# Patient Record
Sex: FEMALE | Race: WHITE
[De-identification: names, ages, dates, MRNs, and addresses within clinical notes are randomized per-mention and may not be internally consistent; named-entity substitution may affect disease eponyms.]

---

## 2018-11-06 ENCOUNTER — HOSPITAL ENCOUNTER (OUTPATIENT)
Dept: HOSPITAL 80 - FLD | Age: 23
Setting detail: OBSERVATION
Discharge: HOME | End: 2018-11-06
Attending: OBSTETRICS & GYNECOLOGY | Admitting: OBSTETRICS & GYNECOLOGY
Payer: COMMERCIAL

## 2018-11-06 DIAGNOSIS — O99.333: ICD-10-CM

## 2018-11-06 DIAGNOSIS — Z3A.38: ICD-10-CM

## 2018-11-06 DIAGNOSIS — Z91.040: ICD-10-CM

## 2018-11-06 DIAGNOSIS — O21.2: Primary | ICD-10-CM

## 2018-11-06 DIAGNOSIS — F17.210: ICD-10-CM

## 2018-11-06 PROCEDURE — 59025 FETAL NON-STRESS TEST: CPT

## 2018-11-06 PROCEDURE — G0378 HOSPITAL OBSERVATION PER HR: HCPCS

## 2018-11-06 NOTE — SOAPPROG
SOAP Progress Note


Assessment/Plan: 


Assessment: 23 G1 at 38w2d with GERD and likely viral gastroenteritis.  


Epigastric pain resolved with GI cocktail.  


Pt desires dc home. 








Plan: DC home. 


Pt instructed to hydrate well.  If N/V and now diarrhea continues for more than 

24 hours, needs to come back as may need IV hydration. 


BRAT diet


FU as scheduled in office in 3 days. 





Maria Dolores Benitez MD, FACOG


[facility] [place] [facility]





11/06/18 11:50





Subjective: 





Pt feeling OK.  Epigastric pain resolved with GI cocktail. 


Has now had one episode of diarrhea.  Is able to sip water.  Has had a couple 

bites of toast without vomiting.  Declines IV placement and IV hydration - 

prefers to try to hydrate at home. 


Objective: 





gen - pleasant, resting comfortably in bed. 


abd - gravid, NT





- Time Spent With Patient


Time Spent With Patient: 





Total time today - 30 min face to face








- Pending Discharge


Pending Discharge Within 24 Hours: Yes


Pending Discharge Within 48 Hours: Yes


Pending Discharge Date: 11/07/18


Pending Discharge Time: 11:00





ICD10 Worksheet


Patient Problems: 


 Problems











Problem Status Onset


 


GERD (gastroesophageal reflux disease) Acute  


 


Gastroenteritis Acute  


 


Gastroenteritis and colitis, viral Acute  


 


Gastroenteritis and colitis, viral Acute  














- ICD10 Problem Qualifiers


(1) GERD (gastroesophageal reflux disease)








(2) Gastroenteritis








(3) Gastroenteritis and colitis, viral








(4) Gastroenteritis and colitis, viral

## 2018-11-06 NOTE — PDGENHP
History and Physical





- Chief Complaint


vomiting and upper abdominal pain





- History of Present Illness


23 G1 at 38w2d presents with an episode of vomiting this morning.  Got up, felt 

some upper abdominal pain, took a bath, minimal relief, back to bed, then got 

up suddenly and vomited and collapsed as she did so.  Also vomited again once 

after arrival here.  Currently no appetite but no persistent nausea.  Has a hx 

of GERD, and sometimes takes Tums, but did not try this morning.  Similar 

episode happened 4 days ago, but resolved and did not happen again until this 

morning.  Denied Etoh or drug use to DEVANTE Michele.  


no VB, no LOF. Good FM.  NO HA, no vis changes, no change in edema. No fevers.  

No change in bowel or bladder habits. 


Is not feeling any contractions. 





Prenatal care with [facility].  


Dating by LMP and 8w5d US. 


A pos


Rubella NON immune


GBS POS


Innatal neg, all other prenatal labs wnl. 











History Information





- Allergies/Home Medication List


Allergies/Adverse Reactions: 








latex Allergy (Verified 11/06/18 07:54)


 Swelling/neck,face,throat


avocado Allergy (Uncoded 11/06/18 07:54)


 





Home Medications: 








Albuterol [Proventil Inhaler HFA (*)] 1 - 2 puffs IH PRN 11/06/18 [Last Taken 

Unknown]


Ferrous Sulfate [Slow Fe 140 MG (*)] 140 mg PO DAILY 11/06/18 [Last Taken 1 Day 

Ago ~11/05/18]


Prenatal Vit27&Calcium/Iron/FA [Prenatal] 1 tab PO DAILY 11/06/18 [Last Taken 1 

Day Ago ~11/05/18]





I have personally reviewed and updated: family history, medical history, social 

history, surgical history


Past Medical History: migraines, ADD - no meds in preg, Asthma, hx depression 

and anxiety.  LSIL pap - needs colp pp.  L axillary mass - stable x 5yr





- Past Medical History


asthma





- Surgical History


Reports: no pertinent surgical hx


Additional surgical history: no surgery





- Family History


Positive for: non-pertinent





- Social History


Smoking Status: Current some day smoker


Tobacco Use: Less than 1 pack/day (says down to 1 cig per day)


Drug Use: None





Review of Systems


Review of Systems: 





ROS: 10pt was reviewed & negative except for what was stated in HPI & below





Physical Exam


Physical Exam: 


FHR - 130, reactive, mod jere, + accels, no decels, Cat 1


toco - no contractions





36.8  20  97  106/61


Constitutional: no apparent distress


Eyes: PERRL, EOMI


Ears, Nose, Mouth, Throat: moist mucous membranes, hearing normal, ears appear 

normal


Cardiovascular: regular rate and rhythym, systolic murmur


Respiratory: no respiratory distress, no rales or rhonchi


Gastrointestinal: normoactive bowel sounds (fundus firm and NT, c/w 38wk), soft

, non-tender abdomen (mild epigastric tenderness, no rebound or guarding, )


Skin: warm, normal color, no rashes or abrasions


Musculoskeletal: no muscle tenderness (BLE - 1+ brisk reflexes, 1+ pitting edema

)


Neurologic: AAOx3


Psychiatric: interacting appropriately, not anxious





Lab Data & Imaging Review





Urine dip neg - will send for full UA and culture if indicated. 





Assessment & Plan


Assessment: 


23 G1 with epigastric pain and vomiting this morning, similar to an episode 4 d 

ago - suspect GERD. 


Will try a GI cocktail - if relieves pain, will recommend starting on regular 

Pepcid or Zantac until delivers. 


Will also consider Zofran if vomiting continues.  


Reassuring fetal monitoring.  


Maria Dolores Benitez MD, FACOG


[facility]

## 2018-11-09 ENCOUNTER — HOSPITAL ENCOUNTER (INPATIENT)
Dept: HOSPITAL 80 - FLD | Age: 23
LOS: 4 days | Discharge: HOME | End: 2018-11-13
Attending: OBSTETRICS & GYNECOLOGY | Admitting: OBSTETRICS & GYNECOLOGY
Payer: COMMERCIAL

## 2018-11-09 DIAGNOSIS — Z3A.38: ICD-10-CM

## 2018-11-09 LAB — PLATELET # BLD: 131 10^3/UL (ref 150–400)

## 2018-11-09 PROCEDURE — 3E033VJ INTRODUCTION OF OTHER HORMONE INTO PERIPHERAL VEIN, PERCUTANEOUS APPROACH: ICD-10-PCS | Performed by: OBSTETRICS & GYNECOLOGY

## 2018-11-09 RX ADMIN — AMPICILLIN SODIUM SCH: 1 INJECTION, POWDER, FOR SOLUTION INTRAMUSCULAR; INTRAVENOUS at 23:17

## 2018-11-09 RX ADMIN — MISOPROSTOL SCH: 100 TABLET ORAL at 23:17

## 2018-11-09 NOTE — GHP
DATE OF ADMISSION:  2018



ADMITTING DIAGNOSIS:  Intrauterine pregnancy at 38-5/7 weeks' gestation with gestational hypertension
 for induction of labor.



HISTORY OF PRESENT ILLNESS:  The patient is a 23-year-old,  1, para 0, with a last menstrual p
eriod of 2018, and an EDC of 2018, which was confirmed by an 8-week ultrasound.  She has 
had good prenatal care at [facility] since registration at 8 weeks' gestation.  Her signifi
cant prenatal risk factor is she has a history of asthma.  She has albuterol that she uses as needed 
and she does a steroid inhaler daily.  She has a history of depression and anxiety.  No current medic
ation.  She will be monitored closely.  She has a left axillary mass that has been stable.  Question 
lipoma versus accessory breast tissue.  It has not changed in pregnancy.  We will observe.  She had a
 low-grade TOM Pap in pregnancy.  Colposcopy was done.  No biopsy was done.  We will perform postpart
um.  She is rubella nonimmune, and she has developed gestational hypertension.  She began having elev
ated blood pressures at 34 weeks' gestation.  She had a blood pressure of 130/88.  Repeats were azalia
r, 110/78.  She was given PIH precautions.  At 36 weeks, blood pressure was 140/66.  She had PIH labs
 done.  The only significant finding was borderline platelets of 131.  Her P:C ratio was 0.298.  She 
was continued to be followed weekly, and she denied any symptoms.  Blood pressure today was again kyler
vated, 140s over 80s, and repeat was the same.  Decision was made to proceed with induction of labor 
for gestational hypertension with a clinical diagnosis of more than 1 elevated blood pressure over 14
0s over 90s.  Patient continues to deny headache, scotomata, right upper quadrant pain, or significan
t edema.  She reports good fetal movement.  No leakage of fluid.  No vaginal bleeding.  No significan
t contractions.



PAST OBSTETRICAL HISTORY:  Zero.  This is her 1st pregnancy.



PAST GYNECOLOGICAL HISTORY:  Significant for a low-grade TOM Pap in this pregnancy.  Colposcopy was p
erformed, but no biopsies were performed.  This will be repeated postpartum.  She had normal menarche
 at age 16, interval every 25-28 days, length of 3-4 days.  Regular last menstrual period of 20
18.  She has no other gynecological history.



PAST MEDICAL HISTORY:  Asthma, history of anxiety, depression, and a left axillary mass.  No past aj
gical history.  She has a history of migraines.



SOCIAL HISTORY:  She is a smoker.  She has cut down and quit during this pregnancy.  She denies alcoh
ol or drug abuse.  She lives with her partner, Sebastián.  She is not currently working.  They moved from 
[place] approximately 3 months prior to registration for this pregnancy.  She also has a history of 
ADD and has been on Adderall.



ALLERGIES:  She is allergic to latex which causes facial swelling.  She is also allergic to avocados,
 but no known drug allergies.



LABS:  In this pregnancy, she is A positive, antibody negative.  RPR nonreactive.  Rubella nonimmune.
  Hepatitis negative.  HIV negative.  She is an SMA carrier.  Father of the baby was negative.  Pap L
GSIL.  Gonorrhea and chlamydia negative.  Urine negative.  Urine drug screen negative.  Varicella imm
une.  Verify was normal.  AFP was negative.  One-hour GTT 77.  GBS is positive.



FAMILY HISTORY:  Mother had ovarian cancer 7 years ago.  She is still living.



PHYSICAL EXAMINATION:  VITAL SIGNS:  Currently, her blood pressure is 127/73.  Also, she had a 138/88
. Temperature is 36.9.  Other vital signs are stable.  Fetal heart tones are 130s, reactive moderate 
variability, category 1.  She is not vinicio.  Cervix in the office was closed, 40% high, and the
 baby was confirmed to be cephalic.  Labs are pending at this time.



ASSESSMENT/PLAN:  A 23-year-old  1, para 0, at 38-5/7 weeks' gestation, with gestational hyper
tension, documented blood pressures over 140s over 90s on more than 1 occasion, for induction of labo
r.  Patient will have Cytotec for cervical ripening overnight and assessment in the morning for possi
ble Pitocin versus Ibarra catheter for further ripening with Pitocin.  [facility] labs are pending, as well a
s a repeat urine P:C.





Job #:  [id number]

## 2018-11-10 RX ADMIN — AMPICILLIN SODIUM SCH: 1 INJECTION, POWDER, FOR SOLUTION INTRAMUSCULAR; INTRAVENOUS at 11:20

## 2018-11-10 RX ADMIN — MISOPROSTOL SCH: 100 TABLET ORAL at 11:21

## 2018-11-10 RX ADMIN — AMPICILLIN SODIUM SCH: 1 INJECTION, POWDER, FOR SOLUTION INTRAMUSCULAR; INTRAVENOUS at 08:48

## 2018-11-10 RX ADMIN — MISOPROSTOL SCH MCG: 100 TABLET ORAL at 01:15

## 2018-11-10 RX ADMIN — MISOPROSTOL SCH: 100 TABLET ORAL at 08:48

## 2018-11-10 NOTE — OBPROG
Labor Progress Note


Assessment/Plan: 


Assessment:








22 y/o  @ 38 6/7 weeks for IOL secondary to GHTN














Plan:





Pt is s/p Cytotec x 2 - last at 0130, unable to give additional doses secondary 

to frequent ctx's


SVE: FT/50/-2, anterior


Corey bulb was placed via speculum and inflated with 40 cc NS; pt natan well; no 

complications


Became aware that pt is allergic to Latex, and Benadryl was given-so far pt is 

doing well with no reaction


FHTs - Cat I tracing, reassuring


Will keep corey in place x 12 hours and then start Pitocin around midnight 

along with PCN for GBS prophylaxis


BPs stable, pt remains asymptomatic


Will cont to closely monitor














11/10/18 12:17





Subjective/Intrapartum Course: 





11/10/18 12:10


Pt doing well with no complaints. States some mild cramping. Denies any HAs, 

visual changes or RUQ pain. Good FM noted. Denies any LOF or VB. 








Objective: 





 





 18 21:35 





 18 21:35 





 











Patient ABO/Rh  A POSITIVE   18  21:35    


 


Uric Acid  4.2 mg/dL (2.5-6.8)   18  21:35    


 


Total Bilirubin  0.2 mg/dL (0.1-1.4)   18  21:35    


 


Conjugated Bilirubin  0.1 mg/dL (0.0-0.5)   18  21:35    


 


Unconjugated Bilirubin  0.1 mg/dL (0.0-1.1)   18  21:35    


 


AST  23 IU/L (14-46)   18  21:35    


 


ALT  31 IU/L (9-52)   18  21:35    


 


Lactate Dehydrogenase  460 IU/L (313-618)   18  21:35    














- SVE


Dilation (cm): 1 (FT)


Effacement (%): 50


Station: -2


Membranes: Intact





- Contraction Pattern Assessment


Current Contraction Pattern: Irregular (q3-5 min)





- FHR Assessment


  ** Gilbert


FHR (bpm): 140


FHR Pattern Variability: Moderate


FHR Category: 1





- Procedures


Non-surgical Procedures: Other (Specify) (Corey bulb placement)





- AP


Antepartum Course: 





11/10/18 12:15


GHTN.  +tobacco use, ?quit. Rubella nonimmune. GBS positive. Abn pap -LSIL.





Oxytocin Orders Assessment





- Pre-Induction/Augmentation Assessment


Gestational Age: 38 week(s) and 5 day(s)





ICD10 Worksheet


Patient Problems: 


 Problems











Problem Status Onset


 


Encounter for induction of labor Acute  


 


GBS (group B Streptococcus carrier), +RV culture, currently pregnant Acute  


 


Gestational hypertension Acute

## 2018-11-11 PROCEDURE — 0KQM0ZZ REPAIR PERINEUM MUSCLE, OPEN APPROACH: ICD-10-PCS | Performed by: OBSTETRICS & GYNECOLOGY

## 2018-11-11 PROCEDURE — 10907ZC DRAINAGE OF AMNIOTIC FLUID, THERAPEUTIC FROM PRODUCTS OF CONCEPTION, VIA NATURAL OR ARTIFICIAL OPENING: ICD-10-PCS | Performed by: OBSTETRICS & GYNECOLOGY

## 2018-11-11 RX ADMIN — IBUPROFEN SCH MG: 600 TABLET ORAL at 23:23

## 2018-11-11 RX ADMIN — PENICILLIN G POTASSIUM SCH MLS: 20000000 POWDER, FOR SOLUTION INTRAVENOUS at 00:09

## 2018-11-11 RX ADMIN — PENICILLIN G POTASSIUM SCH MLS: 20000000 POWDER, FOR SOLUTION INTRAVENOUS at 08:14

## 2018-11-11 RX ADMIN — ACETAMINOPHEN SCH: 325 TABLET ORAL at 17:24

## 2018-11-11 RX ADMIN — PENICILLIN G POTASSIUM SCH MLS: 20000000 POWDER, FOR SOLUTION INTRAVENOUS at 04:00

## 2018-11-11 RX ADMIN — Medication SCH MLS: at 10:20

## 2018-11-11 RX ADMIN — PENICILLIN G POTASSIUM SCH: 20000000 POWDER, FOR SOLUTION INTRAVENOUS at 02:34

## 2018-11-11 RX ADMIN — PENICILLIN G POTASSIUM SCH: 20000000 POWDER, FOR SOLUTION INTRAVENOUS at 17:31

## 2018-11-11 RX ADMIN — PENICILLIN G POTASSIUM SCH MLS: 20000000 POWDER, FOR SOLUTION INTRAVENOUS at 11:58

## 2018-11-11 RX ADMIN — Medication SCH MLS: at 13:53

## 2018-11-11 NOTE — OBPROG
Labor Progress Note


Assessment/Plan: 


Assessment:


24 y/o  @ 42 y/o IOL secondary to Gestational HTN























Plan:


She is having more pain and not imminent for delivery.  We will call anesthesia 

for a bolus and adjustment.  Continue pitocin and fetal status is reassuring.  


18 09:58





18 11:31





Subjective/Intrapartum Course: 





11/10/18 12:10


Pt doing well with no complaints. States some mild cramping. Denies any HAs, 

visual changes or RUQ pain. Good FM noted. Denies any LOF or VB. 








18 09:55


Pt is resting comfortably with her epidural.  She is aware of some contractions 

but denies pain.  No pelvic pressure.


18 11:29


I was called to assess patient because of difficulties with pain control from 

her epidural.  She is feeling contractions along her lower abdomen and pelvis. 

Not rectal pressure, pain.  


Objective: 





 





 18 21:35 





 18 21:35 





 











Patient ABO/Rh  A POSITIVE   18  21:35    


 


Uric Acid  4.2 mg/dL (2.5-6.8)   18  21:35    


 


Total Bilirubin  0.2 mg/dL (0.1-1.4)   18  21:35    


 


Conjugated Bilirubin  0.1 mg/dL (0.0-0.5)   18  21:35    


 


Unconjugated Bilirubin  0.1 mg/dL (0.0-1.1)   18  21:35    


 


AST  23 IU/L (14-46)   18  21:35    


 


ALT  31 IU/L (9-52)   18  21:35    


 


Lactate Dehydrogenase  460 IU/L (313-618)   18  21:35    














- SVE


Dilation (cm): 7


Effacement (%): 80


Station: -1


Membranes: AROM, Intact


Amniotic Fluid Color: Meconium Stained





- Contraction Pattern Assessment


Current Contraction Pattern: Regular (Q 3-4), Irregular (q3-5 min)





- FHR Assessment


  ** Gilbert


FHR (bpm): 120


FHR Pattern Variability: Moderate


FHR Category: 1





- Procedures


Non-surgical Procedures: Amniotomy, IUPC, Other (Specify) (Ibarra bulb placement)





- AP


Antepartum Course: 





11/10/18 12:15


GHTN.  +tobacco use, ?quit. Rubella nonimmune. GBS positive. Abn pap -LSIL.





Oxytocin Orders Assessment





- Pre-Induction/Augmentation Assessment


Gestational Age: 38 week(s) and 5 day(s)





ICD10 Worksheet


Patient Problems: 


 Problems











Problem Status Onset


 


Encounter for induction of labor Acute  


 


GBS (group B Streptococcus carrier), +RV culture, currently pregnant Acute  


 


Gestational hypertension Acute

## 2018-11-11 NOTE — OBPROG
Labor Progress Note


Assessment/Plan: 


Assessment:


24 y/o  @ 42 y/o IOL secondary to Gestational HTN























Plan:


On exam, baby feels to be OP presentation.  We will bolus again and try to 

encourage rotation with side positioning and peanut ball, or hands and knees 

with the bean bag.  Fetal status is reassuring.   


18 09:58





18 11:31





18 13:59





Subjective/Intrapartum Course: 





11/10/18 12:10


Pt doing well with no complaints. States some mild cramping. Denies any HAs, 

visual changes or RUQ pain. Good FM noted. Denies any LOF or VB. 








18 09:55


Pt is resting comfortably with her epidural.  She is aware of some contractions 

but denies pain.  No pelvic pressure.


18 11:29


I was called to assess patient because of difficulties with pain control from 

her epidural.  She is feeling contractions along her lower abdomen and pelvis. 

Not rectal pressure, pain.  


18 13:58


Pt is having increased back pain with contractions, not rectal pressure.  She 

had temporarily gotten significant pain relief from an epidural bolus.  


Objective: 





 





 18 21:35 





 18 21:35 





 











Patient ABO/Rh  A POSITIVE   18  21:35    


 


Uric Acid  4.2 mg/dL (2.5-6.8)   18  21:35    


 


Total Bilirubin  0.2 mg/dL (0.1-1.4)   18  21:35    


 


Conjugated Bilirubin  0.1 mg/dL (0.0-0.5)   18  21:35    


 


Unconjugated Bilirubin  0.1 mg/dL (0.0-1.1)   18  21:35    


 


AST  23 IU/L (14-46)   18  21:35    


 


ALT  31 IU/L (9-52)   18  21:35    


 


Lactate Dehydrogenase  460 IU/L (313-618)   18  21:35    














- SVE


Dilation (cm): 7


Effacement (%): 90


Station: +1


Membranes: AROM, Intact


Amniotic Fluid Color: Meconium Stained





- Contraction Pattern Assessment


Current Contraction Pattern: Regular (Q 2-3), Irregular (q3-5 min)





- FHR Assessment


  ** Gilbert


FHR (bpm): 130


FHR Pattern Variability: Moderate


FHR Category: 1





- Procedures


Non-surgical Procedures: Amniotomy, IUPC, Other (Specify) (Ibarra bulb placement)





- AP


Antepartum Course: 





11/10/18 12:15


GHTN.  +tobacco use, ?quit. Rubella nonimmune. GBS positive. Abn pap -LSIL.





Oxytocin Orders Assessment





- Pre-Induction/Augmentation Assessment


Gestational Age: 38 week(s) and 5 day(s)





ICD10 Worksheet


Patient Problems: 


 Problems











Problem Status Onset


 


Encounter for induction of labor Acute  


 


GBS (group B Streptococcus carrier), +RV culture, currently pregnant Acute  


 


Gestational hypertension Acute

## 2018-11-11 NOTE — POSTANESTH
Post Anesthetic Evaluation


Cardiovascular Status: Normal, Stable


Respiratory Status: Normal, Stable, Similar to Pre-op Cond.


Level of Consciousness/Mental Status: Can Participate in Eval, Alert and 

Oriented


Pain Control: Adequate, Prn Tx Ordered


Nausea/Vomiting Control: Adequate, Prn Tx Ordered


Complications Possibly Related to Anesthesia: None Noted (Good analgesia.)

## 2018-11-11 NOTE — OBDEL
Birth Info


Birth Type: Vaginal


Presentation at Delivery: Vertex


L&D Analgesia/Anesthesia Type: Epidural


GBS+: Yes


Antibiotic Used for + GBS: Ampicillin


Intrapartum Medications: 





 











Generic Name Dose Route Start Last Admin





  Trade Name Alex  PRN Reason Stop Dose Admin


 


Calcium Carbonate  500 mg  18 21:47  11/10/18 05:38





  Tums  PO  19 21:46  500 mg





  TID PRN   Administration





  Indigestion   


 


Calcium Carbonate  500 - 1,000 mg  18 22:45  18 22:45





  Tums  PO  19 22:44  500 mg





  Q4 PRN   Administration





  INDIGESTION   


 


Diphenhydramine HCl  25 mg  11/10/18 11:59  11/10/18 12:10





  Benadryl  PO  19 11:58  25 mg





  Q6HRS PRN   Administration





  Itching   


 


Oxytocin/Lactated Ringer's  500 mls @ 0 mls/hr  11/10/18 12:00  18 00:09





  Pitocin 30 Units/Lr (Premix)  IV  19 11:59  500 mls





  CONT GERA   Administration





  Protocol   





  Per Protocol   


 


Penicillin G Potassium 2,500,  155 mls @ 155 mls/hr  18 04:00  18 11

:58





  000 unit/ Dextrose  IV  18 00:00  155 mls





  Q4H GERA   Administration





  Protocol   


 


Fentanyl/Bupivacaine HCl  100 mls @ 0 mls/hr  18 04:30  18 13:53





  Fentanyl/Bupivacaine/Ns 2 Mcg/Ml 0.1% (Premix  EP  18 04:29  100 mls





  CONT GERA   Administration





  Protocol   





  As Directed   


 


Phenylephrine HCl  100 mcg  18 04:18  18 04:15





  Neosynephrine  IVP  05/10/19 04:17  100 mcg





  .Q2M PRN   Administration





  Hypotension   














Discontinued Medications














Generic Name Dose Route Start Last Admin





  Trade Name Alex  PRN Reason Stop Dose Admin


 


Ampicillin Sodium 2 gm/ Sodium  110 mls @ 220 mls/hr  18 18:18  18 

23:16





  Chloride  IV  18 18:47  Not Given





  ONCE ONE   





  Protocol   


 


Ampicillin Sodium 1 gm/ Sodium  100 mls @ 200 mls/hr  18 22:30  11/10/18 

11:20





  Chloride  IV  18 22:29  Not Given





  Q4H [facility]   





  Protocol   


 


Lactated Ringer's  500 mls @ 500 mls/hr  11/10/18 12:00  18 00:09





  Lr  IV  18 12:00  500 mls





  PRN PRN   Administration





  Maternal Hypotension   


 


Penicillin G Potassium 2,500,  155 mls @ 155 mls/hr  18 00:01  18 02

:34





  000 unit/ Dextrose  IV  18 00:00  Not Given





  Q4HRS [facility]   





  Protocol   


 


Misoprostol  25 mcg  18 19:30  18 21:19





  Cytotec  PO  18 19:31  25 mcg





  ONCE ONE   Administration


 


Misoprostol  50 mcg  18 21:30  11/10/18 11:21





  Cytotec  PO  19 21:29  Not Given





  Q4H [facility]   














- Infant Care Provider


Pediatrician/NNP: Taylor Lambert





- Hospital Course


Intrapartum: 





11/10/18 12:10


Pt doing well with no complaints. States some mild cramping. Denies any HAs, 

visual changes or RUQ pain. Good FM noted. Denies any LOF or VB. 








18 09:55


Pt is resting comfortably with her epidural.  She is aware of some contractions 

but denies pain.  No pelvic pressure.


18 11:29


I was called to assess patient because of difficulties with pain control from 

her epidural.  She is feeling contractions along her lower abdomen and pelvis. 

Not rectal pressure, pain.  


18 13:58


Pt is having increased back pain with contractions, not rectal pressure.  She 

had temporarily gotten significant pain relief from an epidural bolus.  


Indications for Delivery: Gestational Hypertension





Vaginal Delivery





- Delivery Provider


Delivery Physician/CNM: Debbie Lee





- Labor and Delivery


Onset of Contractions Date: 18


Onset of Contractions Time: 02:30


Onset of Contractions Type: Induced


Rupture of Membranes Date: 18


Rupture of Membranes Time: 09:51


Rupture of Membranes Type: Artificial


Amniotic Fluid Color: Meconium Stained


Dilation Complete Date: 18


Dilation Complete Time: 15:55


Placenta Delivery Date: 18


Placenta Delivery Time: 16:37


Total Hours of Labor: 14


Non-surgical Procedures: Amniotomy, IUPC, Other (Specify) (Ibarra bulb placement)


Laceration: 2nd Degree, Other (Specify) (right labial)


Repair: 2-0, 3-0, Vicryl


Vaginal Sponge Count Correct: Yes


Vaginal Needle Count Correct: Yes


Vaginal Sweep Performed: Yes


EBL: 300


Delivery Events: None





- Medications


Labor Augmentation/Induction Methods Used: Pitocin, Misoprostol, Ibarra Bulb


Labor Augmentation/Induction Indication: Other (Specify) (Gestational HTN)





[place] Birth Data


DONNELL: 18


Gestational Age: 39 week(s) and 0 day(s)


  ** Gilbert


Delivery Date: 18


Delivery Time: 16:32


Sex of Infant: Male


Apgar Score (1 Min): 8


Apgar Score (5 Min): 8





ICD10 Worksheet


Patient Problems: 


 Problems











Problem Status Onset


 


Encounter for induction of labor Acute  


 


GBS (group B Streptococcus carrier), +RV culture, currently pregnant Acute  


 


Gestational hypertension Acute  


 


 (spontaneous vaginal delivery) Acute  














- ICD10 Problem Qualifiers


(1)  (spontaneous vaginal delivery)

## 2018-11-11 NOTE — PREANESOB
Obstetric Pre-Anesthesia Info





- General Info


: 1


Para: 0


DONNELL: 18


Gestational Age: 38 week(s) and 5 day(s)





- Fetal Info


Fetal Status: Full Term


Fetal Monitors: External


FHR Pattern: Reassuring





- Labor Status


Cervical Dilation per last OB SVE: 1 (FT)


Station per last OB SVE: -2


PIH: Mild


Indications for Labor Analgesia: Induction of Labor


Labor Epidural: Proposed





Anesthesia


Allergies/Adverse Reactions: 


 











Allergy/AdvReac Type Severity Reaction Status Date / Time


 


latex Allergy  Swelling/ne Verified 18 07:54





   ck,face,thr  





   oat  


 


avocado Allergy   Uncoded 18 07:54











Home Medications: 














 Medication  Instructions  Recorded


 


Albuterol [Proventil Inhaler HFA 1 - 2 puffs IH PRN 18





(*)]  


 


Ferrous Sulfate [Slow Fe 140  mg PO DAILY 18





(*)]  


 


Prenatal Vit27&Calcium/Iron/FA 1 tab PO DAILY 18





[Prenatal]  











Visit Medications: 





 











Generic Name Dose Route Start Last Admin





  Trade Name Freq  PRN Reason Stop Dose Admin


 


Acetaminophen  1,000 mg  18 21:47  





  Tylenol  PO  19 21:46  





  Q6HRS PRN   





  Pain, Mild/Fever, Can Take PO   


 


Ammonia (Aromatic Spirit)  1 each  18 18:18  





  Ammonia Aromatic  IH  18 18:17  





  ONCE PRN   





  Fainting   


 


Calcium Carbonate  500 mg  18 21:47  11/10/18 05:38





  Tums  PO  19 21:46  500 mg





  TID PRN   Administration





  Indigestion   


 


Calcium Carbonate  500 - 1,000 mg  18 22:45  18 22:45





  Tums  PO  19 22:44  500 mg





  Q4 PRN   Administration





  INDIGESTION   


 


Diphenhydramine HCl  25 mg  11/10/18 11:59  11/10/18 12:10





  Benadryl  PO  19 11:58  25 mg





  Q6HRS PRN   Administration





  Itching   


 


Oxytocin/Lactated Ringer's  1,000 mls @ 0 mls/hr  18 18:18  





  Pitocin 20 Units/Lr (Premix)  IV   





  PRN PRN   





  Post partum bleeding   





  Wide Open   


 


Oxytocin/Lactated Ringer's  500 mls @ 0 mls/hr  11/10/18 12:00  18 00:09





  Pitocin 30 Units/Lr (Premix)  IV  19 11:59  500 mls





  CONT GERA   Administration





  Protocol   





  Per Protocol   


 


Penicillin G Potassium 2,500,  155 mls @ 155 mls/hr  18 04:00  





  000 unit/ Dextrose  IV  18 00:00  





  Q4H GERA   





  Protocol   


 


Ibuprofen  600 mg  18 18:18  





  Motrin  PO   





  ONCE PRN   





  post partum, pain   


 


Lidocaine HCl  300 mg  18 18:18  





  Lidocaine Hcl 1%  SC  19 18:17  





  ONCE PRN   





  episiotomy   


 


Magnesium Sulfate  454 gm  18 18:18  





  Epsom Salt  TP  19 18:17  





  Q1H PRN   





  perineal discomfort    


 


Misoprostol  800 - 1,000 mcg  18 18:18  





  Cytotec  PO  19 18:17  





  ONCE PRN   





  Vaginal Atony/Bleeding   


 


Olive Oil  118 ml  18 18:18  





  Sweet Oil  MISC  19 18:17  





  ONCE PRN   





  perineal massage   


 


Terbutaline Sulfate  0.25 mg  18 18:18  





  Brethine  IV  19 18:17  





  ONCE PRN   





  Tachysystole   














Discontinued Medications














Generic Name Dose Route Start Last Admin





  Trade Name Freq  PRN Reason Stop Dose Admin


 


Fentanyl/Bupivacaine HCl  Confirm  18 03:33  





  Fentanyl/Bupivacaine/Ns 2 Mcg/Ml 0.1% (Premix  Administered  18 03:34  





  Dose   





  100 ml   





  EP   





  .STK-MED ONE   


 


Ampicillin Sodium 2 gm/ Sodium  110 mls @ 220 mls/hr  18 18:18  18 

23:16





  Chloride  IV  18 18:47  Not Given





  ONCE ONE   





  Protocol   


 


Ampicillin Sodium 1 gm/ Sodium  100 mls @ 200 mls/hr  18 22:30  11/10/18 

11:20





  Chloride  IV  18 22:29  Not Given





  Q4H [facility]   





  Protocol   


 


Lactated Ringer's  1,000 mls @ 0 mls/hr  18 18:18  





  Lr  IV  11/10/18 18:17  





  PRN PRN   





  SEE PROTOCOL CONDITIONS   





  Protocol   





  Per Protocol   


 


Lactated Ringer's  500 mls @ 500 mls/hr  11/10/18 12:00  18 00:09





  Lr  IV  18 12:00  500 mls





  PRN PRN   Administration





  Maternal Hypotension   


 


Penicillin G Potassium 2,500,  155 mls @ 155 mls/hr  18 00:01  18 02

:34





  000 unit/ Dextrose  IV  18 00:00  Not Given





  Q4HRS [facility]   





  Protocol   


 


Misoprostol  25 mcg  18 19:30  18 21:19





  Cytotec  PO  18 19:31  25 mcg





  ONCE ONE   Administration


 


Misoprostol  50 mcg  18 21:30  11/10/18 11:21





  Cytotec  PO  19 21:29  Not Given





  Q4H [facility]   


 


Oxytocin  Confirm  18 22:37  





  Pitocin  Administered  18 22:38  





  Dose   





  10 unit   





  .ROUTE   





  .STK-MED ONE   


 


Phenylephrine HCl  Confirm  18 03:33  





  Neosynephrine  Administered  18 03:34  





  Dose   





  1,000 mcg   





  .ROUTE   





  .STK-MED ONE   














- Vital Signs


Height/Weight (Nursing): 





 











Height                         175.26 cm


 


Weight                         85.729 kg

















- Focused Exam


Neck exam: FROM


Mallampati Score: Class 2


Mouth exam: normal dental/mouth exam


Pulmonary: no respiratory distress, no rales or rhonchi, clear to auscultation


Cardiovascular: regular rate and rhythym, no murmur, rub, or gallop


Labs: 





 





 18 21:35 





 18 21:35 





 











Patient ABO/Rh  A POSITIVE   18  21:35    


 


Uric Acid  4.2 mg/dL (2.5-6.8)   18  21:35    


 


Total Bilirubin  0.2 mg/dL (0.1-1.4)   18  21:35    


 


Conjugated Bilirubin  0.1 mg/dL (0.0-0.5)   18  21:35    


 


Unconjugated Bilirubin  0.1 mg/dL (0.0-1.1)   18  21:35    


 


AST  23 IU/L (14-46)   18  21:35    


 


ALT  31 IU/L (9-52)   18  21:35    


 


Lactate Dehydrogenase  460 IU/L (313-618)   18  21:35

## 2018-11-11 NOTE — OBPROG
Labor Progress Note


Assessment/Plan: 


Assessment:


22 y/o  @ 42 y/o IOL secondary to Gestational HTN























Plan:


She is on pitocin and is comfortable with her epidural.  Good cervical progress

, hopeful that AROM with accelerate things.  IUPC place to ensure adequate 

contractions.  Fetal status is reassuring.  BP have been stable and she remains 

asymptomatic.


18 09:58





Subjective/Intrapartum Course: 





11/10/18 12:10


Pt doing well with no complaints. States some mild cramping. Denies any HAs, 

visual changes or RUQ pain. Good FM noted. Denies any LOF or VB. 








18 09:55


Pt is resting comfortably with her epidural.  She is aware of some contractions 

but denies pain.  No pelvic pressure.


Objective: 





 





 18 21:35 





 18 21:35 





 











Patient ABO/Rh  A POSITIVE   18  21:35    


 


Uric Acid  4.2 mg/dL (2.5-6.8)   18  21:35    


 


Total Bilirubin  0.2 mg/dL (0.1-1.4)   18  21:35    


 


Conjugated Bilirubin  0.1 mg/dL (0.0-0.5)   18  21:35    


 


Unconjugated Bilirubin  0.1 mg/dL (0.0-1.1)   18  21:35    


 


AST  23 IU/L (14-46)   18  21:35    


 


ALT  31 IU/L (9-52)   18  21:35    


 


Lactate Dehydrogenase  460 IU/L (313-618)   18  21:35    








113/57, 125/70, 101/55, 112/58, 127/66





- SVE


Dilation (cm): 6


Effacement (%): 75


Station: -2


Membranes: AROM, Intact


Amniotic Fluid Color: Meconium Stained





- Contraction Pattern Assessment


Current Contraction Pattern: Regular (Q 4-5), Irregular (q3-5 min)





- FHR Assessment


  ** Gilbert


FHR (bpm): 120


FHR Pattern Variability: Moderate


FHR Category: 1





- Procedures


Non-surgical Procedures: Amniotomy, IUPC, Other (Specify) (Ibarra bulb placement)





- AP


Antepartum Course: 





11/10/18 12:15


GHTN.  +tobacco use, ?quit. Rubella nonimmune. GBS positive. Abn pap -LSIL.





Oxytocin Orders Assessment





- Pre-Induction/Augmentation Assessment


Gestational Age: 38 week(s) and 5 day(s)





ICD10 Worksheet


Patient Problems: 


 Problems











Problem Status Onset


 


Encounter for induction of labor Acute  


 


GBS (group B Streptococcus carrier), +RV culture, currently pregnant Acute  


 


Gestational hypertension Acute

## 2018-11-12 RX ADMIN — IBUPROFEN SCH MG: 600 TABLET ORAL at 04:41

## 2018-11-12 RX ADMIN — ACETAMINOPHEN SCH MG: 325 TABLET ORAL at 13:07

## 2018-11-12 RX ADMIN — IBUPROFEN SCH MG: 600 TABLET ORAL at 19:17

## 2018-11-12 RX ADMIN — IBUPROFEN SCH MG: 600 TABLET ORAL at 13:07

## 2018-11-12 RX ADMIN — DOCUSATE SODIUM PRN MG: 100 CAPSULE, LIQUID FILLED ORAL at 10:18

## 2018-11-12 RX ADMIN — ACETAMINOPHEN SCH: 325 TABLET ORAL at 00:30

## 2018-11-12 RX ADMIN — ACETAMINOPHEN SCH MG: 325 TABLET ORAL at 19:17

## 2018-11-12 RX ADMIN — Medication SCH MG: at 10:18

## 2018-11-12 RX ADMIN — ACETAMINOPHEN SCH MG: 325 TABLET ORAL at 04:40

## 2018-11-12 NOTE — OBPP
PostPartum Progress Note


Assessment/Plan: 


Assessment:








1)  s/p  IOL for GHTN PPD # 1 - pt is stable








2)  Anemia - pt is asymptomatic














Plan:





Continue routine pp care


BPs stable


Encourage ambulation


Baby boy in NICU for respiratory issues on Gent


Will start iron, cont colace


Plan for d/c home or border in am 11/3











18 09:35





Subjective/Postpartum Course: 





18 09:33


Pt seen and examined. Doing well, up in the NICU getting ready to breast feed 

baby boy. Mild cramping. Mod lochia. Pt is natan regular diet, voiding without 

difficulty and passing flatus. No BM yet. She has already pumped 1 oz of 

colostrum. Denies any dizziness when up OOB, ambulating.


Objective: 





 





 18 05:00 





 18 21:35 





 











Patient ABO/Rh  A POSITIVE   18  21:35    


 


Uric Acid  4.2 mg/dL (2.5-6.8)   18  21:35    


 


Total Bilirubin  0.2 mg/dL (0.1-1.4)   18  21:35    


 


Conjugated Bilirubin  0.1 mg/dL (0.0-0.5)   18  21:35    


 


Unconjugated Bilirubin  0.1 mg/dL (0.0-1.1)   18  21:35    


 


AST  23 IU/L (14-46)   18  21:35    


 


ALT  31 IU/L (9-52)   18  21:35    


 


Lactate Dehydrogenase  460 IU/L (313-618)   18  21:35    








 











Temp Pulse Resp BP Pulse Ox


 


 36.7 C   84   16   112/60   96 


 


 18 09:17  18 09:17  18 09:17  18 09:17  18 09:17











Uterine Position/Fundal Height: Umbilicus -2


Uterine Tone: Firm





PostPartum Physical Exam





- Physical Exam


General Appearance: alert, no apparent distress, mild distress


Respiratory: lungs clear, normal breath sounds


Cardiac/Chest: regular rate, rhythm


Abdomen: normal bowel sounds, non-tender, soft, flatus (+)


Extremities: non-tender, normal inspection


Skin: normal color, warm/dry


Neuro/Psych: alert, normal mood/affect, oriented x 3

## 2018-11-12 NOTE — POSTANESTH
Post Anesthetic Evaluation


Cardiovascular Status: Normal, Stable


Respiratory Status: Normal, Stable


Level of Consciousness/Mental Status: Can Participate in Eval


Pain Control: Adequate, Prn Tx Ordered


Nausea/Vomiting Control: Adequate, Prn Tx Ordered


Complications Possibly Related to Anesthesia: None Noted (Patient reports good 

analgesia from labor epidural. She denies any residual weakness/numbness, 

ambulating without difficulty. No headache or visual/auditory complaints.)

## 2018-11-13 VITALS — SYSTOLIC BLOOD PRESSURE: 115 MMHG | DIASTOLIC BLOOD PRESSURE: 67 MMHG

## 2018-11-13 RX ADMIN — IBUPROFEN SCH MG: 600 TABLET ORAL at 03:13

## 2018-11-13 RX ADMIN — DOCUSATE SODIUM PRN MG: 100 CAPSULE, LIQUID FILLED ORAL at 09:57

## 2018-11-13 RX ADMIN — ACETAMINOPHEN SCH MG: 325 TABLET ORAL at 03:12

## 2018-11-13 RX ADMIN — Medication SCH MG: at 09:57

## 2018-11-13 RX ADMIN — ACETAMINOPHEN SCH: 325 TABLET ORAL at 15:00

## 2018-11-13 RX ADMIN — IBUPROFEN SCH MG: 600 TABLET ORAL at 09:57

## 2018-11-13 NOTE — OBPP
PostPartum Progress Note


Assessment/Plan: 


Assessment: 23 G1 now P1 PPD#2 s/p  after IOL for gest HTN, doing well, 

anemia





Plan: DC home - reviewed std pp instructions, including ssx pp depression. See 

dc summary. 


Maria Dolores Benitez MD, FACOG\


[facility]





18 10:38





Subjective/Postpartum Course: 





18 09:33


Pt seen and examined. Doing well, up in the NICU getting ready to breast feed 

baby boy. Mild cramping. Mod lochia. Pt is natan regular diet, voiding without 

difficulty and passing flatus. No BM yet. She has already pumped 1 oz of 

colostrum. Denies any dizziness when up OOB, ambulating.


18 10:41


Pt doing well.  Ambulating and voiding without difficulty.  Has had BM without 

difficulty. Mod lochia.  Natan reg diet.  Pumping and breastfeeding. No 

lightheadedness and dizziness.  


Objective: 





 





 18 05:00 





 18 21:35 





 











Patient ABO/Rh  A POSITIVE   18  21:35    


 


Uric Acid  4.2 mg/dL (2.5-6.8)   18  21:35    


 


Total Bilirubin  0.2 mg/dL (0.1-1.4)   18  21:35    


 


Conjugated Bilirubin  0.1 mg/dL (0.0-0.5)   18  21:35    


 


Unconjugated Bilirubin  0.1 mg/dL (0.0-1.1)   18  21:35    


 


AST  23 IU/L (14-46)   18  21:35    


 


ALT  31 IU/L (9-52)   18  21:35    


 


Lactate Dehydrogenase  460 IU/L (313-618)   18  21:35    








 











Temp Pulse Resp BP Pulse Ox


 


 36.6 C   84   16   117/62   96 


 


 18 20:50  18 20:50  18 20:50  18 20:50  18 20:50








BPs stable over the past 24 hours. 


gen - pleasant female, NAD


CV - RRR


chest - CTAB


abd - soft, + BS, fundus firm at U-2


ext - 2+ pitting edema BLE, no calf tenderness


perineum - declined exam


Uterine Position/Fundal Height: Umbilicus -2


Uterine Tone: Firm